# Patient Record
(demographics unavailable — no encounter records)

---

## 2025-04-03 NOTE — ASSESSMENT
[FreeTextEntry1] :  He has a history of snoring.  He said testing for sleep apnea a few years ago was negative.  He does suffer from insomnia so he does have fatigue.  He has no nasal or throat symptoms otherwise.  On exam, he has a deviated septum and moderate base of tongue enlargement.  The palate was also mildly elongated.  Plan -Findings and management options were discussed with the patient. - The patient was asked to avoid sleeping on his back, eating before bed and drinking alcohol at night.  He does think that positional changes help.  He may try a specialized pillow to keep him on his side - Good sleep hygiene recommended - If he had nasal obstruction or congestion, I recommend a nasal steroid spray - He may speak with his dentist about trying oral appliance for snoring.  He may also try an over-the-counter device - Other options include surgical procedures such as LAUP and base of tongue reduction.  I gave him the information for my colleague who performs these procedures - I asked him to consider follow-up at the sleep center for management of the insomnia -I will see him back as needed Continue Regimen: Birth control pill daily while on treatment Detail Level: Zone

## 2025-04-03 NOTE — HISTORY OF PRESENT ILLNESS
[de-identified] : TOMY CHEN is a 54 year old patient With a several year history of snoring.  He was wondering if there could be a polyp or lesion causing obstruction.  He has no known gasping for air pauses in his sleep.  He has insomnia and wakes up a lot at night.  He said he was evaluated in the past for this sleep issues.  He said sleep study was negative.  He does not complain of significant nasal congestion, nasal obstruction, throat pain, dysphagia, or voice change.  He does not have a known history of reflux.  He has not had change in his weight.  He does not smoke or vape tobacco or marijuana. He does feel that sleepin on his side decreases the snoring.

## 2025-04-03 NOTE — CONSULT LETTER
[Dear  ___] : Dear  [unfilled], [Consult Letter:] : I had the pleasure of evaluating your patient, [unfilled]. [Please see my note below.] : Please see my note below. [Consult Closing:] : Thank you very much for allowing me to participate in the care of this patient.  If you have any questions, please do not hesitate to contact me. [Sincerely,] : Sincerely, [FreeTextEntry3] : Lashell Hills MD [facility] at [facility] Otolaryngology  Head & Neck Surgery [facility]   Department of Otolaryngology [facility] [employer] [facility] at \[facility] [place] [facility]\""/[facility]  Office Tel: [phone]

## 2025-04-03 NOTE — PHYSICAL EXAM
[TextEntry] : PHYSICAL EXAM  General: The patient was alert, oriented and in no distress. Voice was clear.  Face: The patient had no facial asymmetry or mass. The skin was unremarkable.  Ears: Hearing normal to conversational voice External ears were normal without deformity. Ear canals were clear. No cerumen or inflammation Tympanic membranes were intact and normal. No perforation or effusion. mobile  Nose:  The external nose had no significant deformity.. On anterior rhinoscopy, the nasal mucosa was clear.  The anterior septum was grossly midline. There were no visualized polyps, purulence  or masses.   Oral cavity: Oral mucosa- normal. Oral and base of tongue- clear and without mass. Gingival and buccal mucosa- moist and without lesions. Palate- the palate moved well. There was no cleft palate. There appeared to be good salivary flow.   Oral cavity/oropharynx- no pus, erythema or mass 1+ tonsils Type I Lott tongue position  Neck:  The neck was symmetrical. The parotid and submandibular glands were normal without masses. The trachea was midline and there was no unusual crepitus. Thyroid was smooth and nontender and no masses were palpated. No masses  Lymphatics: Cervical adenopathy- none.    PROCEDURE:  FLEXIBLE LARYNGOSCOPY, NASAL ENDOSCOPY   Surgeon: Dr. Hills Indication: Snoring, assess for lesions, inadequate exam on anterior rhinoscopy Anesthetic: Topical lidocaine and Afrin Procedure: The patient was placed in a sitting position.  Following application of the topical anesthetic and decongestant, exam was performed with a flexible telescope.  The scope was passed along the right nasal floor to the nasopharynx.  It was then passed into the region of the middle meatus, middle turbinate, and sphenoethmoid region.  An identical procedure was performed on the left side.  The following findings were noted:  Nasal mucosa: Mild edema Septum: Deviated bilaterally  Right nasal cavity      Inferior turbinate: Hypertrophic      Middle turbinate: normal      Superior turbinate: normal      Inferior meatus: no pus, polyps or congestion      Middle meatus:  no pus, polyps or congestion       Superior meatus:  no pus, polyps, or congestion      Sphenoethmoidal recess: no pus, polyps or congestion   Left nasal cavity      Inferior turbinate: Hypertrophic      Middle turbinate: normal      Superior turbinate: normal      Inferior meatus: no pus, polyps or congestion      Middle meatus: no pus, polyps, or congestion      Superior meatus:  no pus, polyps, or congestion      Sphenoethmoidal recess: no pus, polyps or congestion   Nasopharynx: no masses, choanae patent, no adenoid tissue  Base of tongue and vallecula: no masses or asymmetry.  Moderate base of tongue enlargement Posterior pharyngeal wall: no masses.  Hypopharynx: symmetrical. No masses Pyriform sinuses: no lesions or pooling of secretions Epiglottis: normal. No edema or lesions Aryepiglottic folds: normal. No lesions.  True vocal cords: clear and mobile. No lesions. Airway patent False vocal cords: normal Ventricles: no masses.  Arytenoids: Normal Interarytenoid area: no masses.  Normal Subglottis: normal. no masses
decreased ability to use arms for pushing/pulling/decreased ability to use legs for bridging/pushing